# Patient Record
Sex: FEMALE | Race: WHITE
[De-identification: names, ages, dates, MRNs, and addresses within clinical notes are randomized per-mention and may not be internally consistent; named-entity substitution may affect disease eponyms.]

---

## 2020-06-27 ENCOUNTER — HOSPITAL ENCOUNTER (OUTPATIENT)
Dept: HOSPITAL 19 - EUO | Age: 50
Discharge: HOME | End: 2020-06-27
Attending: FAMILY MEDICINE
Payer: OTHER GOVERNMENT

## 2020-06-27 VITALS — DIASTOLIC BLOOD PRESSURE: 55 MMHG | TEMPERATURE: 98.7 F | SYSTOLIC BLOOD PRESSURE: 106 MMHG | HEART RATE: 84 BPM

## 2020-06-27 VITALS — BODY MASS INDEX: 23.05 KG/M2 | WEIGHT: 130.07 LBS | HEIGHT: 62.99 IN

## 2020-06-27 VITALS — SYSTOLIC BLOOD PRESSURE: 102 MMHG | TEMPERATURE: 98.8 F | DIASTOLIC BLOOD PRESSURE: 59 MMHG | HEART RATE: 87 BPM

## 2020-06-27 VITALS — TEMPERATURE: 98.2 F | HEART RATE: 82 BPM | SYSTOLIC BLOOD PRESSURE: 110 MMHG | DIASTOLIC BLOOD PRESSURE: 66 MMHG

## 2020-06-27 VITALS — TEMPERATURE: 98 F | SYSTOLIC BLOOD PRESSURE: 115 MMHG | DIASTOLIC BLOOD PRESSURE: 60 MMHG | HEART RATE: 70 BPM

## 2020-06-27 VITALS — DIASTOLIC BLOOD PRESSURE: 62 MMHG | SYSTOLIC BLOOD PRESSURE: 114 MMHG | TEMPERATURE: 98 F | HEART RATE: 78 BPM

## 2020-06-27 VITALS — TEMPERATURE: 98.5 F | HEART RATE: 78 BPM | DIASTOLIC BLOOD PRESSURE: 69 MMHG | SYSTOLIC BLOOD PRESSURE: 111 MMHG

## 2020-06-27 VITALS — DIASTOLIC BLOOD PRESSURE: 66 MMHG | HEART RATE: 70 BPM | SYSTOLIC BLOOD PRESSURE: 105 MMHG | TEMPERATURE: 98.1 F

## 2020-06-27 VITALS — HEART RATE: 86 BPM | DIASTOLIC BLOOD PRESSURE: 64 MMHG | TEMPERATURE: 98.7 F | SYSTOLIC BLOOD PRESSURE: 128 MMHG

## 2020-06-27 DIAGNOSIS — D50.9: Primary | ICD-10-CM

## 2020-06-27 PROCEDURE — P9016 RBC LEUKOCYTES REDUCED: HCPCS

## 2020-06-27 NOTE — NUR
Patient tolerated blood transfusion well, vital signs remained stable
throughout tranfusion, IV removed, leaving with her , I escorted her
out to the vehicle by wheelchair

## 2020-08-07 ENCOUNTER — HOSPITAL ENCOUNTER (OUTPATIENT)
Dept: HOSPITAL 19 - COL.RAD | Age: 50
End: 2020-08-07
Attending: FAMILY MEDICINE
Payer: OTHER GOVERNMENT

## 2020-08-07 VITALS — HEART RATE: 89 BPM | DIASTOLIC BLOOD PRESSURE: 88 MMHG | SYSTOLIC BLOOD PRESSURE: 158 MMHG

## 2020-08-07 VITALS — HEIGHT: 62.99 IN | BODY MASS INDEX: 23.12 KG/M2 | WEIGHT: 130.51 LBS

## 2020-08-07 DIAGNOSIS — E04.2: Primary | ICD-10-CM

## 2020-08-07 DIAGNOSIS — Z90.89: ICD-10-CM

## 2020-08-07 NOTE — NUR
pt to us for procedure. Dr Hand into talk with pt.  Dr Hand wants report
from [facility].  Dr office notified of needed reports.

## 2020-08-07 NOTE — NUR
Report to Dr Hand, Dr Hand into talk with pt.  Pt informed that thyroid
does not meet criteria for biopsy and his recomendation is to rescan in one
year.  Procedure canceled.  Pt back to holding area, pt dressed and pt taken
to entrance.

## 2020-12-22 ENCOUNTER — HOSPITAL ENCOUNTER (INPATIENT)
Dept: HOSPITAL 19 - COL.ER | Age: 50
LOS: 2 days | Discharge: HOME | DRG: 74 | End: 2020-12-24
Attending: STUDENT IN AN ORGANIZED HEALTH CARE EDUCATION/TRAINING PROGRAM | Admitting: STUDENT IN AN ORGANIZED HEALTH CARE EDUCATION/TRAINING PROGRAM
Payer: OTHER GOVERNMENT

## 2020-12-22 VITALS — TEMPERATURE: 98 F | SYSTOLIC BLOOD PRESSURE: 134 MMHG | DIASTOLIC BLOOD PRESSURE: 84 MMHG | HEART RATE: 80 BPM

## 2020-12-22 VITALS — HEART RATE: 72 BPM | DIASTOLIC BLOOD PRESSURE: 71 MMHG | TEMPERATURE: 98.1 F | SYSTOLIC BLOOD PRESSURE: 146 MMHG

## 2020-12-22 VITALS — HEART RATE: 78 BPM | SYSTOLIC BLOOD PRESSURE: 111 MMHG | DIASTOLIC BLOOD PRESSURE: 64 MMHG | TEMPERATURE: 97.3 F

## 2020-12-22 VITALS — HEART RATE: 72 BPM | SYSTOLIC BLOOD PRESSURE: 146 MMHG | DIASTOLIC BLOOD PRESSURE: 71 MMHG | TEMPERATURE: 98.1 F

## 2020-12-22 VITALS — WEIGHT: 123.46 LBS | BODY MASS INDEX: 21.88 KG/M2 | HEIGHT: 62.99 IN

## 2020-12-22 DIAGNOSIS — D50.9: ICD-10-CM

## 2020-12-22 DIAGNOSIS — F17.210: ICD-10-CM

## 2020-12-22 DIAGNOSIS — G62.1: Primary | ICD-10-CM

## 2020-12-22 DIAGNOSIS — F10.10: ICD-10-CM

## 2020-12-22 DIAGNOSIS — E04.2: ICD-10-CM

## 2020-12-22 DIAGNOSIS — Z86.718: ICD-10-CM

## 2020-12-22 DIAGNOSIS — Z88.6: ICD-10-CM

## 2020-12-22 LAB
ALBUMIN SERPL-MCNC: 4.7 GM/DL (ref 3.5–5)
ALP SERPL-CCNC: 98 U/L (ref 50–136)
ALT SERPL-CCNC: 19 U/L (ref 4–34)
ANION GAP SERPL CALC-SCNC: 10 MMOL/L (ref 7–16)
AST SERPL-CCNC: 44 U/L (ref 15–37)
BASOPHILS # BLD: 0 10*3/UL (ref 0–0.2)
BASOPHILS NFR BLD AUTO: 0.4 % (ref 0–2)
BILIRUB SERPL-MCNC: 0.5 MG/DL (ref 0–1)
BUN SERPL-MCNC: 8 MG/DL (ref 7–17)
CALCIUM SERPL-MCNC: 9.5 MG/DL (ref 8.4–10.2)
CHLORIDE SERPL-SCNC: 104 MMOL/L (ref 98–107)
CO2 SERPL-SCNC: 24 MMOL/L (ref 22–30)
CREAT SERPL-SCNC: 0.52 UMOL/L (ref 0.52–1.25)
EOSINOPHIL # BLD: 0.1 10*3/UL (ref 0–0.7)
EOSINOPHIL NFR BLD: 0.8 % (ref 0–4)
ERYTHROCYTE [DISTWIDTH] IN BLOOD BY AUTOMATED COUNT: 18.6 % (ref 11.5–14.5)
GLUCOSE SERPL-MCNC: 87 MG/DL (ref 74–106)
GRANULOCYTES # BLD AUTO: 69.8 % (ref 42.2–75.2)
HCT VFR BLD AUTO: 41.8 % (ref 37–47)
HGB BLD-MCNC: 11.7 G/DL (ref 12.5–16)
LYMPHOCYTES # BLD: 1.9 10*3/UL (ref 1.2–3.4)
LYMPHOCYTES NFR BLD: 22.9 % (ref 20–51)
MCH RBC QN AUTO: 23 PG (ref 27–31)
MCHC RBC AUTO-ENTMCNC: 28 G/DL (ref 33–37)
MCV RBC AUTO: 81 FL (ref 80–100)
MONOCYTES # BLD: 0.5 10*3/UL (ref 0.1–0.6)
MONOCYTES NFR BLD AUTO: 6 % (ref 1.7–9.3)
NEUTROPHILS # BLD: 5.9 10*3/UL (ref 1.4–6.5)
PLATELET # BLD AUTO: 270 K/MM3 (ref 130–400)
PMV BLD AUTO: 10.2 FL (ref 7.4–10.4)
POTASSIUM SERPL-SCNC: 3.9 MMOL/L (ref 3.4–5)
PROT SERPL-MCNC: 8.1 GM/DL (ref 6.4–8.2)
RBC # BLD AUTO: 5.15 M/MM3 (ref 4.1–5.3)
SODIUM SERPL-SCNC: 138 MMOL/L (ref 137–145)
TSH SERPL DL<=0.005 MIU/L-ACNC: 2.85 UIU/ML (ref 0.47–4.68)

## 2020-12-22 PROCEDURE — A9585 GADOBUTROL INJECTION: HCPCS

## 2020-12-22 NOTE — NUR
PT PLEASANT, AOX4, DENIES PAIN/DISCOMFORT, ADMISSION COMPLETED, TELE CALLED
REPORTING ASYTOLE, IMMEDIATLEY WENT INTO PT ROOM AND PT AOX4 AND TALKING.
CALLED TELE AND RESOLVED TELEMETRY ISSUE. PT VITALS TAKEN, PT DID NOT BRING
MEDICATIONS, ORDERS FROM DR. BURTON OFFICE PLACED, CALLED [facility] FOR DIET ORDER,
BROUGHT IN ICE WATER FOR PT, EDUCATED PT TO CALL BEFORE SHE GOT UP TO ASSESS
GAIT. NO OTHER NEEDS.

## 2020-12-23 VITALS — HEART RATE: 64 BPM | TEMPERATURE: 98.3 F | DIASTOLIC BLOOD PRESSURE: 71 MMHG | SYSTOLIC BLOOD PRESSURE: 116 MMHG

## 2020-12-23 VITALS — HEART RATE: 76 BPM | DIASTOLIC BLOOD PRESSURE: 77 MMHG | SYSTOLIC BLOOD PRESSURE: 112 MMHG | TEMPERATURE: 98.2 F

## 2020-12-23 VITALS — TEMPERATURE: 98 F | HEART RATE: 77 BPM | SYSTOLIC BLOOD PRESSURE: 110 MMHG | DIASTOLIC BLOOD PRESSURE: 69 MMHG

## 2020-12-23 VITALS — DIASTOLIC BLOOD PRESSURE: 75 MMHG | HEART RATE: 69 BPM | SYSTOLIC BLOOD PRESSURE: 115 MMHG | TEMPERATURE: 97.4 F

## 2020-12-23 VITALS — SYSTOLIC BLOOD PRESSURE: 124 MMHG | DIASTOLIC BLOOD PRESSURE: 69 MMHG

## 2020-12-23 VITALS — SYSTOLIC BLOOD PRESSURE: 143 MMHG | DIASTOLIC BLOOD PRESSURE: 80 MMHG

## 2020-12-23 VITALS — DIASTOLIC BLOOD PRESSURE: 70 MMHG | SYSTOLIC BLOOD PRESSURE: 142 MMHG

## 2020-12-23 VITALS — TEMPERATURE: 97.8 F | DIASTOLIC BLOOD PRESSURE: 59 MMHG | SYSTOLIC BLOOD PRESSURE: 112 MMHG | HEART RATE: 78 BPM

## 2020-12-23 VITALS — SYSTOLIC BLOOD PRESSURE: 147 MMHG | DIASTOLIC BLOOD PRESSURE: 74 MMHG | HEART RATE: 70 BPM

## 2020-12-23 VITALS — SYSTOLIC BLOOD PRESSURE: 142 MMHG | DIASTOLIC BLOOD PRESSURE: 70 MMHG

## 2020-12-23 VITALS — HEART RATE: 80 BPM | SYSTOLIC BLOOD PRESSURE: 147 MMHG | DIASTOLIC BLOOD PRESSURE: 76 MMHG

## 2020-12-23 LAB
APPEARANCE CSF: CLEAR
COLOR CSF: COLORLESS
FOLATE (FOLIC ACID): 18.5 NG/ML (ref 7–31.4)
GLUCOSE CSF-MCNC: 52 MG/DL (ref 40–70)
GRANULOCYTES NFR CSF MANUAL: 1 % (ref 0–6)
MONOCYTES NFR CSF MANUAL: 2 % (ref 70–100)
PROT CSF-MCNC: 30 MG/DL (ref 15–45)
RBC # CSF: 7 /MM3 (ref 0–0)

## 2020-12-23 NOTE — NUR
TELEMETRY CALLED STATING THAT THE PATIENT TEL.WAS READING ASYSTOLE.CHECKED THE
PATIENT SHE WAS ALERT AND ORIENTED AND TALKING.CHECKED ON THE TEL.BOX ITS
WORKING NOW.NO NEEDS AT THIS TIME.

## 2020-12-23 NOTE — NUR
DR. BURTON CALLED WITH RESULTS OF MRI AND LP. ORDERED GABAPENTIN 100MG BID FOR
3 DAYS THEN GABAPENTIN 100MG TID. WANTS TO SEE PT IN OFFICE OUTPATIENT IN 2
WEEKS. RELAYED THIS TO PT.

## 2020-12-23 NOTE — NUR
CONSENT OBTAINED FROM PT, PT VERBALIZED UNDERSTANDING OF PROCEDURE AND DID NOT
HAVE ANY QUESTIONS. INFORMED PT OF 1400 TIME FOR MRI AND THEN THE LUMBAR
PUNCTURE WOULD BE RIGHT AFTER. INFORMED PT TO BE NPO FOR LUNCH FOR MRI AND LP.
NO OTHER NEEDS.

## 2020-12-23 NOTE — NUR
met with patient to discuss discharge planning. Patient lives in
[place] with her , Neri (ph#[phone]) and their son, Henri who
is 29 years old. Patient sees Dr. Duckworth for primary care and obtains
medications from [facility] with no difficulties. Patient does not use
DME and is independent with [facility]. Patient does not have Advance Directives and
is not interested in designating [facility]PO[facility] at this time. SW adressed alcohol use
with patient who stated she drinks between 8-10 beers a day. Patient states
after being in the hospital a couple days, she will probably reduce how much
she drinks. Patient denies ever receiving inpatient or outpatient treatment.
Patient is not interested in any alcohol resources at this time. Patient plans
to return home upon discharge. SW contacted patient's , Neri to review
discharge plan. Neri has no concerns about patient returning home at this
time but inquired if patient had an MRI yet today. No needs identified at this
time.

## 2020-12-23 NOTE — NUR
PT LEFT FLOOR VIA WHEELCHAIR FOR MRI AND LP. DR. BURTON REQUESTS CALL WITH
RESULTS OF CSF CELL COUNT, CSF PROTEIN, AND CSF GLUCOSE AND ANY ABNORMAL MRI
RESULTS.

## 2020-12-23 NOTE — NUR
PT LAID FLAT FOR FULL HOUR AFTER PROCEDURE, LAST BP /80, PT AOX4, PT
INDEPENDENT IN ROOM, PT DENIES WEAKNESS BUT REPORTS NUMBNESS/TINGLING. MRI AND
LP PERFORMED.  UPDATED, NO OTHER NEEDS AT THIS TIME.

## 2020-12-23 NOTE — NUR
PT BACK FROM PROCEDURE, UPDATED HER ON MRI RESULTS, EDUCATED HER TO LAY FLAT
UNTIL 1730. WILL MONITOR VITALS UNTIL 1HR FLAT TIME IS OVER. NO OTHER NEEDS.

## 2020-12-23 NOTE — NUR
PATIENT IS PLEASANT,SHE IS INDEPENDENT IN THE ROOM,HAD AN IV FLUSH,DENIES PAIN
NO OTHER MEDICATION AT THIS TIME,PROVIDED WITH ICE WATER.NO OTHER NEEDS AT
THIS TIME.

## 2020-12-23 NOTE — NUR
PT , BOBBI, UPDATED. INITIALLY UPSET BECAUSE HE DID NOT KNOW HIS WIFE
WAS GETTING AN LP AND THOUGHT SHE WOULD BE DISCHARGED TODAY. EXPLAINED TO
 PLAN OF CARE AND PURPOSE OF EACH TEST AND WHAT WE WERE LOOKING FOR AND
WHY. UPDATED HIM THAT MRI OF HEAD CAME BACK NEGATIVE FOR ANY ABNORMALITIES.
BOBBI WAS ANXIOUS ABOUT HIS WIFE UNDERGOING ANY SURGERIES AT THE HOSPITAL,
INFORMED HIM WE WOULD UPDATE HIM BEFORE THAT WOULD HAPPEN. ALL QUESTIONS WERE
ANSWERED AND BOBBI SEEMED MORE RELIEVED AT END OF CALL.  BOBBI' NUMBER IS
[phone]. WILL CONTINUE TO UPDATE, AND HE WOULD LIKE UPDATES BEFORE ANY
TREATMENT IS ESTABLISHED.

## 2020-12-24 VITALS — DIASTOLIC BLOOD PRESSURE: 75 MMHG | SYSTOLIC BLOOD PRESSURE: 132 MMHG | HEART RATE: 86 BPM | TEMPERATURE: 97.4 F

## 2020-12-24 VITALS — SYSTOLIC BLOOD PRESSURE: 103 MMHG | DIASTOLIC BLOOD PRESSURE: 62 MMHG | TEMPERATURE: 98 F | HEART RATE: 68 BPM

## 2020-12-24 NOTE — NUR
Patient alert and oriented. passed neuro and detox screening. patient denies
any pain. Dr Nielsen discharge patient home with Gabapentin 100mg. Unable to
schedule followup visit with Dr Metzger office because office is not open for
holiday season. Provided patient with the office information. RN emphasis the
importance of the followup appointment with patient. Patient verbalize
understanding. Patient have no further concern or question at this time. INT
discontinued. Patient discharge home with spouse.

## 2020-12-24 NOTE — NUR
PT REST IN BED THIS SHIFT. INDEPENDENT IN ROOM. TAKES SELF TO BATHROOM. SL
INTACT IN RIGHT HAND. FLUSHES WELL. TELE SHOWS SR. VS STABLE THIS SHIFT. NEURO
CHECKS ALL WITHIN NORMAL LIMITS. DENIES NUMBNESS TO BILAT LE. PLEASANT PT.
CALL LIGHT WITHIN REACH. WILL CONTINUE TO MONITOR.

## 2020-12-26 LAB
ACE SERPL-CCNC: 42 U/L (ref 16–85)
CADMIUM BLD-MCNC: 0.8 NG/ML (ref ?–5)

## 2020-12-28 LAB
OLIGOCLONAL BANDS CSF ELPH-IMP: 0 BANDS (ref ?–2)
OLIGOCLONAL BANDS CSF ELPH-IMP: 3 BANDS
OLIGOCLONAL BANDS SERPL: 3 BANDS